# Patient Record
Sex: MALE | Race: WHITE
[De-identification: names, ages, dates, MRNs, and addresses within clinical notes are randomized per-mention and may not be internally consistent; named-entity substitution may affect disease eponyms.]

---

## 2017-03-16 ENCOUNTER — APPOINTMENT (OUTPATIENT)
Dept: GASTROENTEROLOGY | Facility: CLINIC | Age: 62
End: 2017-03-16

## 2017-05-08 ENCOUNTER — APPOINTMENT (OUTPATIENT)
Dept: GASTROENTEROLOGY | Facility: CLINIC | Age: 62
End: 2017-05-08
Payer: COMMERCIAL

## 2017-05-08 PROCEDURE — 45378 DIAGNOSTIC COLONOSCOPY: CPT

## 2018-02-27 ENCOUNTER — RX RENEWAL (OUTPATIENT)
Age: 63
End: 2018-02-27

## 2018-02-27 DIAGNOSIS — K64.9 UNSPECIFIED HEMORRHOIDS: ICD-10-CM

## 2018-02-27 DIAGNOSIS — L29.0 PRURITUS ANI: ICD-10-CM

## 2018-05-07 ENCOUNTER — CLINICAL ADVICE (OUTPATIENT)
Age: 63
End: 2018-05-07

## 2018-07-17 ENCOUNTER — RX RENEWAL (OUTPATIENT)
Age: 63
End: 2018-07-17

## 2018-08-16 ENCOUNTER — APPOINTMENT (OUTPATIENT)
Dept: GASTROENTEROLOGY | Facility: CLINIC | Age: 63
End: 2018-08-16

## 2018-11-07 ENCOUNTER — RX RENEWAL (OUTPATIENT)
Age: 63
End: 2018-11-07

## 2019-07-19 ENCOUNTER — RX CHANGE (OUTPATIENT)
Age: 64
End: 2019-07-19

## 2021-07-15 ENCOUNTER — LABORATORY RESULT (OUTPATIENT)
Age: 66
End: 2021-07-15

## 2021-07-15 ENCOUNTER — APPOINTMENT (OUTPATIENT)
Dept: GASTROENTEROLOGY | Facility: CLINIC | Age: 66
End: 2021-07-15
Payer: MEDICARE

## 2021-07-15 VITALS
HEIGHT: 67 IN | HEART RATE: 64 BPM | BODY MASS INDEX: 21.97 KG/M2 | SYSTOLIC BLOOD PRESSURE: 133 MMHG | WEIGHT: 140 LBS | DIASTOLIC BLOOD PRESSURE: 63 MMHG

## 2021-07-15 DIAGNOSIS — K21.00 GASTRO-ESOPHAGEAL REFLUX DISEASE WITH ESOPHAGITIS, WITHOUT BLEEDING: ICD-10-CM

## 2021-07-15 DIAGNOSIS — R15.2 FECAL URGENCY: ICD-10-CM

## 2021-07-15 DIAGNOSIS — Z82.49 FAMILY HISTORY OF ISCHEMIC HEART DISEASE AND OTHER DISEASES OF THE CIRCULATORY SYSTEM: ICD-10-CM

## 2021-07-15 DIAGNOSIS — Z86.010 PERSONAL HISTORY OF COLONIC POLYPS: ICD-10-CM

## 2021-07-15 DIAGNOSIS — R73.03 PREDIABETES.: ICD-10-CM

## 2021-07-15 DIAGNOSIS — Z82.0 FAMILY HISTORY OF EPILEPSY AND OTHER DISEASES OF THE NERVOUS SYSTEM: ICD-10-CM

## 2021-07-15 DIAGNOSIS — E78.5 HYPERLIPIDEMIA, UNSPECIFIED: ICD-10-CM

## 2021-07-15 DIAGNOSIS — K52.9 NONINFECTIVE GASTROENTERITIS AND COLITIS, UNSPECIFIED: ICD-10-CM

## 2021-07-15 DIAGNOSIS — I10 ESSENTIAL (PRIMARY) HYPERTENSION: ICD-10-CM

## 2021-07-15 DIAGNOSIS — Z80.8 FAMILY HISTORY OF MALIGNANT NEOPLASM OF OTHER ORGANS OR SYSTEMS: ICD-10-CM

## 2021-07-15 DIAGNOSIS — Z87.891 PERSONAL HISTORY OF NICOTINE DEPENDENCE: ICD-10-CM

## 2021-07-15 DIAGNOSIS — R63.4 ABNORMAL WEIGHT LOSS: ICD-10-CM

## 2021-07-15 PROCEDURE — 99204 OFFICE O/P NEW MOD 45 MIN: CPT

## 2021-07-15 PROCEDURE — 82274 ASSAY TEST FOR BLOOD FECAL: CPT | Mod: QW

## 2021-07-15 RX ORDER — HYDROCORTISONE ACETATE AND PRAMOXINE HYDROCHLORIDE 25; 10 MG/G; MG/G
2.5-1 CREAM TOPICAL
Qty: 1 | Refills: 3 | Status: DISCONTINUED | COMMUNITY
Start: 2018-02-27 | End: 2021-07-15

## 2021-07-15 RX ORDER — LISINOPRIL 20 MG/1
20 TABLET ORAL
Refills: 0 | Status: ACTIVE | COMMUNITY

## 2021-07-15 RX ORDER — PRAMOXINE HYDROCHLORIDE AND HYDROCORTISONE ACETATE 10; 25 MG/G; MG/G
2.5-1 CREAM TOPICAL
Qty: 1 | Refills: 3 | Status: DISCONTINUED | COMMUNITY
Start: 2019-07-19 | End: 2021-07-15

## 2021-07-15 RX ORDER — LOVASTATIN 40 MG/1
40 TABLET ORAL
Refills: 0 | Status: ACTIVE | COMMUNITY

## 2021-07-15 NOTE — ASSESSMENT
[FreeTextEntry1] : 1.  Unintentional weight loss of unclear cause--may solely be related to stress/anxiety.  Possible celiac disease, IBD, underlying neoplasm.\par 2.  History of GERD; grade C erosive esophagitis, hiatal hernia at EGD January 2012.\par 3.  History of acute self-limited jejunitis November 2016.\par 4.  Hypertension.\par 5.  Hyperlipidemia.\par 6.  Prediabetes.\par 7.  Ex-smoker.\par 8.  Osteoarthritis.\par 9.  Status post cholecystectomy.\par \par Suggest:\par 1.  Extensive bloodwork drawn by me this afternoon.\par 2.  Dermatology evaluation for annual "skin check."\par 3.  Results of labs will help determine if any further GI evaluation is indicated at this time.  He will call for test results next week.

## 2021-07-15 NOTE — HISTORY OF PRESENT ILLNESS
[FreeTextEntry1] : Óscar reports unintentional weight loss, perhaps 7 pounds over the past year; he did weight 23 pounds more (documented from last office visit) in 2017.  He reports occasional postprandial urgency, which she feels could be stress-related.  He has been averaging 2-3 BMs daily for quite some time.  He is under increased stress, as his daughter was recently diagnosed with papillary cancer of the thyroid, to undergo surgery shortly.  Last colonoscopy May 2017 revealed only hemorrhoids.  Grade C erosive esophagitis and hiatal hernia were noted at EGD January 2012.  He had been diagnosed with acute, self-limited jejunitis in 2016.  He is now taking some supplements for osteoarthritis.

## 2021-07-15 NOTE — PHYSICAL EXAM
[General Appearance - Alert] : alert [General Appearance - In No Acute Distress] : in no acute distress [General Appearance - Well Nourished] : well nourished [General Appearance - Well Developed] : well developed [Sclera] : the sclera and conjunctiva were normal [Neck Appearance] : the appearance of the neck was normal [Neck Cervical Mass (___cm)] : no neck mass was observed [Jugular Venous Distention Increased] : there was no jugular-venous distention [Thyroid Diffuse Enlargement] : the thyroid was not enlarged [Thyroid Nodule] : there were no palpable thyroid nodules [Auscultation Breath Sounds / Voice Sounds] : lungs were clear to auscultation bilaterally [Heart Rate And Rhythm] : heart rate was normal and rhythm regular [Heart Sounds] : normal S1 and S2 [Heart Sounds Gallop] : no gallops [Murmurs] : no murmurs [Heart Sounds Pericardial Friction Rub] : no pericardial rub [Full Pulse] : the pedal pulses are present [Edema] : there was no peripheral edema [Bowel Sounds] : normal bowel sounds [Abdomen Soft] : soft [Abdomen Tenderness] : non-tender [Abdomen Mass (___ Cm)] : no abdominal mass palpated [Abdomen Hernia] : no hernia was discovered [Normal Sphincter Tone] : normal sphincter tone [No Rectal Mass] : no rectal mass [Internal Hemorrhoid] : internal hemorrhoids [Prostate Size___ (Scale 0-4)] : prostate size was [unfilled] on a scale of 0-4 [Cervical Lymph Nodes Enlarged Posterior Bilaterally] : posterior cervical [Cervical Lymph Nodes Enlarged Anterior Bilaterally] : anterior cervical [Supraclavicular Lymph Nodes Enlarged Bilaterally] : supraclavicular [Inguinal Lymph Nodes Enlarged Bilaterally] : inguinal [Abnormal Walk] : normal gait [Nail Clubbing] : no clubbing  or cyanosis of the fingernails [Musculoskeletal - Swelling] : no joint swelling seen [Motor Tone] : muscle strength and tone were normal [Skin Color & Pigmentation] : normal skin color and pigmentation [Skin Turgor] : normal skin turgor [] : no rash [Oriented To Time, Place, And Person] : oriented to person, place, and time [Impaired Insight] : insight and judgment were intact [Affect] : the affect was normal [External Hemorrhoid] : no external hemorrhoids [Occult Blood Positive] : stool was negative for occult blood [Prostate Tenderness] : was not tender [FreeTextEntry1] : Multiple moles, skin tags

## 2021-07-15 NOTE — CONSULT LETTER
[Dear  ___] : Dear  [unfilled], [Consult Letter:] : I had the pleasure of evaluating your patient, [unfilled]. [Please see my note below.] : Please see my note below. [Consult Closing:] : Thank you very much for allowing me to participate in the care of this patient.  If you have any questions, please do not hesitate to contact me. [Sincerely,] : Sincerely, [FreeTextEntry3] : Jaciel Tilley M.D.\par

## 2021-07-17 ENCOUNTER — NON-APPOINTMENT (OUTPATIENT)
Age: 66
End: 2021-07-17

## 2021-07-17 LAB
25(OH)D3 SERPL-MCNC: 51.7 NG/ML
ALBUMIN SERPL ELPH-MCNC: 4.6 G/DL
ALP BLD-CCNC: 73 U/L
ALT SERPL-CCNC: 17 U/L
AMYLASE/CREAT SERPL: 85 U/L
ANION GAP SERPL CALC-SCNC: 10 MMOL/L
AST SERPL-CCNC: 19 U/L
BASOPHILS # BLD AUTO: 0.03 K/UL
BASOPHILS NFR BLD AUTO: 0.4 %
BILIRUB DIRECT SERPL-MCNC: 0.2 MG/DL
BILIRUB SERPL-MCNC: 0.8 MG/DL
BUN SERPL-MCNC: 22 MG/DL
CALCIUM SERPL-MCNC: 10 MG/DL
CHLORIDE SERPL-SCNC: 106 MMOL/L
CO2 SERPL-SCNC: 26 MMOL/L
CREAT SERPL-MCNC: 1.13 MG/DL
CRP SERPL-MCNC: <3 MG/L
EOSINOPHIL # BLD AUTO: 0.07 K/UL
EOSINOPHIL NFR BLD AUTO: 1 %
ERYTHROCYTE [SEDIMENTATION RATE] IN BLOOD BY WESTERGREN METHOD: 9 MM/HR
FERRITIN SERPL-MCNC: 110 NG/ML
GGT SERPL-CCNC: 9 U/L
GLIADIN IGA SER QL: <5 UNITS
GLIADIN IGG SER QL: <5 UNITS
GLIADIN PEPTIDE IGA SER-ACNC: NEGATIVE
GLIADIN PEPTIDE IGG SER-ACNC: NEGATIVE
GLUCOSE SERPL-MCNC: 92 MG/DL
HCT VFR BLD CALC: 47.1 %
HGB BLD-MCNC: 16 G/DL
IMM GRANULOCYTES NFR BLD AUTO: 0.1 %
IRON SATN MFR SERPL: 27 %
IRON SERPL-MCNC: 84 UG/DL
LPL SERPL-CCNC: 40 U/L
LYMPHOCYTES # BLD AUTO: 1.38 K/UL
LYMPHOCYTES NFR BLD AUTO: 20.3 %
MAGNESIUM SERPL-MCNC: 2.2 MG/DL
MAN DIFF?: NORMAL
MCHC RBC-ENTMCNC: 30.3 PG
MCHC RBC-ENTMCNC: 34 GM/DL
MCV RBC AUTO: 89.2 FL
MONOCYTES # BLD AUTO: 0.47 K/UL
MONOCYTES NFR BLD AUTO: 6.9 %
NEUTROPHILS # BLD AUTO: 4.85 K/UL
NEUTROPHILS NFR BLD AUTO: 71.3 %
PHOSPHATE SERPL-MCNC: 3 MG/DL
PLATELET # BLD AUTO: 251 K/UL
POTASSIUM SERPL-SCNC: 4.9 MMOL/L
PROT SERPL-MCNC: 6.9 G/DL
RBC # BLD: 5.28 M/UL
RBC # FLD: 12.1 %
SODIUM SERPL-SCNC: 142 MMOL/L
T3 SERPL-MCNC: 103 NG/DL
T3RU NFR SERPL: 1 TBI
T4 FREE SERPL-MCNC: 1.3 NG/DL
T4 SERPL-MCNC: 7.2 UG/DL
TIBC SERPL-MCNC: 314 UG/DL
TSH SERPL-ACNC: 1.05 UIU/ML
TTG IGA SER IA-ACNC: 1.2 U/ML
TTG IGA SER-ACNC: NEGATIVE
TTG IGG SER IA-ACNC: 4.5 U/ML
TTG IGG SER IA-ACNC: NEGATIVE
UIBC SERPL-MCNC: 231 UG/DL
VIT B12 SERPL-MCNC: 336 PG/ML
WBC # FLD AUTO: 6.81 K/UL

## 2021-07-20 ENCOUNTER — NON-APPOINTMENT (OUTPATIENT)
Age: 66
End: 2021-07-20

## 2021-07-22 ENCOUNTER — NON-APPOINTMENT (OUTPATIENT)
Age: 66
End: 2021-07-22

## 2021-07-26 LAB
ENDOMYSIUM IGA SER QL: NEGATIVE
ENDOMYSIUM IGA TITR SER: NORMAL
IGA SER QL IEP: 265 MG/DL

## 2022-07-15 ENCOUNTER — APPOINTMENT (OUTPATIENT)
Dept: ORTHOPEDIC SURGERY | Facility: CLINIC | Age: 67
End: 2022-07-15

## 2022-07-15 VITALS — BODY MASS INDEX: 21.97 KG/M2 | HEIGHT: 67 IN | WEIGHT: 140 LBS

## 2022-07-15 DIAGNOSIS — M47.816 SPONDYLOSIS W/OUT MYELOPATHY OR RADICULOPATHY, LUMBAR REGION: ICD-10-CM

## 2022-07-15 PROCEDURE — 72100 X-RAY EXAM L-S SPINE 2/3 VWS: CPT

## 2022-07-15 PROCEDURE — 99214 OFFICE O/P EST MOD 30 MIN: CPT

## 2022-07-15 PROCEDURE — 72170 X-RAY EXAM OF PELVIS: CPT

## 2022-07-15 PROCEDURE — 99204 OFFICE O/P NEW MOD 45 MIN: CPT

## 2022-07-15 RX ORDER — METHYLPREDNISOLONE 4 MG/1
4 TABLET ORAL
Qty: 1 | Refills: 0 | Status: ACTIVE | COMMUNITY
Start: 2022-07-15 | End: 1900-01-01

## 2022-07-15 RX ORDER — METHYLPREDNISOLONE 4 MG/1
4 TABLET ORAL
Qty: 1 | Refills: 1 | Status: ACTIVE | COMMUNITY
Start: 2022-07-15 | End: 1900-01-01

## 2022-07-15 NOTE — HISTORY OF PRESENT ILLNESS
[Lower back] : lower back [Left Leg] : left leg [Right Leg] : right leg [Gradual] : gradual [3] : 3 [2] : 2 [Dull/Aching] : dull/aching [Localized] : localized [Radiating] : radiating [Shooting] : shooting [Tingling] : tingling [Nothing helps with pain getting better] : Nothing helps with pain getting better [Full time] : Work status: full time [de-identified] : 7/15/22:  66 y/o RHD M presenting with low back pain for many years. Has had pain into the R groin for a few weeks.   No radicular component. No numbness or tingling. Has pain to the right lower back. some degree of pain in the right hip with movement \par \par  Does stretching and heating pad for the pain. 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

## 2022-07-15 NOTE — DISCUSSION/SUMMARY
[de-identified] : reviewed the case - rec PT for this \par MDP \par discussion of MRi if not getting

## 2022-07-15 NOTE — IMAGING
[de-identified] : General:\par Alignment: normal\par Spinous process: no tenderness\par Paraspinal tenderness: No tenderness\par Range of motion: full and pain free\par Scoliosis: none\par \par Strength: \par Hip Flexors: 5/5 b/l\par Quadriceps: 5/5 b/l\par Dorsi/Plantor flexion: 5/5 b/l\par EHL: 5/5 b/l\par \par Sensation: Intact b/l\par Straight Leg Raise: Negative b/l\par \par leg roll \par \par

## 2022-08-26 ENCOUNTER — APPOINTMENT (OUTPATIENT)
Dept: ORTHOPEDIC SURGERY | Facility: CLINIC | Age: 67
End: 2022-08-26

## 2023-10-04 ENCOUNTER — APPOINTMENT (OUTPATIENT)
Dept: ORTHOPEDIC SURGERY | Facility: CLINIC | Age: 68
End: 2023-10-04

## 2024-11-12 ENCOUNTER — APPOINTMENT (OUTPATIENT)
Dept: ORTHOPEDIC SURGERY | Facility: CLINIC | Age: 69
End: 2024-11-12

## 2024-11-12 VITALS — WEIGHT: 140 LBS | BODY MASS INDEX: 21.97 KG/M2 | HEIGHT: 67 IN

## 2024-11-12 VITALS — HEIGHT: 67 IN | WEIGHT: 150 LBS | BODY MASS INDEX: 23.54 KG/M2

## 2024-11-12 DIAGNOSIS — I10 ESSENTIAL (PRIMARY) HYPERTENSION: ICD-10-CM

## 2024-11-12 DIAGNOSIS — M19.019 PRIMARY OSTEOARTHRITIS, UNSPECIFIED SHOULDER: ICD-10-CM

## 2024-11-12 DIAGNOSIS — E78.00 PURE HYPERCHOLESTEROLEMIA, UNSPECIFIED: ICD-10-CM

## 2024-11-12 DIAGNOSIS — M75.101 UNSPECIFIED ROTATOR CUFF TEAR OR RUPTURE OF RIGHT SHOULDER, NOT SPECIFIED AS TRAUMATIC: ICD-10-CM

## 2024-11-12 DIAGNOSIS — M75.102 UNSPECIFIED ROTATOR CUFF TEAR OR RUPTURE OF LEFT SHOULDER, NOT SPECIFIED AS TRAUMATIC: ICD-10-CM

## 2024-11-12 PROCEDURE — 73030 X-RAY EXAM OF SHOULDER: CPT | Mod: 50

## 2024-11-12 PROCEDURE — 99214 OFFICE O/P EST MOD 30 MIN: CPT

## 2024-11-12 PROCEDURE — G2211 COMPLEX E/M VISIT ADD ON: CPT

## 2024-11-12 PROCEDURE — 99204 OFFICE O/P NEW MOD 45 MIN: CPT

## 2024-12-11 ENCOUNTER — APPOINTMENT (OUTPATIENT)
Dept: ORTHOPEDIC SURGERY | Facility: CLINIC | Age: 69
End: 2024-12-11

## 2024-12-11 VITALS — BODY MASS INDEX: 23.54 KG/M2 | HEIGHT: 67 IN | WEIGHT: 150 LBS

## 2024-12-11 DIAGNOSIS — M75.82 OTHER SHOULDER LESIONS, RIGHT SHOULDER: ICD-10-CM

## 2024-12-11 DIAGNOSIS — M75.81 OTHER SHOULDER LESIONS, RIGHT SHOULDER: ICD-10-CM

## 2024-12-11 DIAGNOSIS — S43.439A SUPERIOR GLENOID LABRUM LESION OF UNSPECIFIED SHOULDER, INITIAL ENCOUNTER: ICD-10-CM

## 2024-12-11 PROCEDURE — 99214 OFFICE O/P EST MOD 30 MIN: CPT

## 2024-12-11 PROCEDURE — G2211 COMPLEX E/M VISIT ADD ON: CPT

## 2024-12-11 RX ORDER — CELECOXIB 200 MG/1
200 CAPSULE ORAL DAILY
Qty: 30 | Refills: 1 | Status: ACTIVE | COMMUNITY
Start: 2024-12-11 | End: 1900-01-01

## 2024-12-27 PROBLEM — M19.012 ARTHRITIS OF SHOULDER REGION, LEFT: Status: ACTIVE | Noted: 2024-12-27

## 2025-01-08 ENCOUNTER — APPOINTMENT (OUTPATIENT)
Dept: ORTHOPEDIC SURGERY | Facility: CLINIC | Age: 70
End: 2025-01-08